# Patient Record
Sex: FEMALE | Race: WHITE | NOT HISPANIC OR LATINO | URBAN - METROPOLITAN AREA
[De-identification: names, ages, dates, MRNs, and addresses within clinical notes are randomized per-mention and may not be internally consistent; named-entity substitution may affect disease eponyms.]

---

## 2023-04-17 LAB
ALANINE AMINOTRANSFERASE (SGPT) (U/L) IN SER/PLAS: 20 U/L (ref 7–45)
ALBUMIN (G/DL) IN SER/PLAS: 4.5 G/DL (ref 3.4–5)
ALKALINE PHOSPHATASE (U/L) IN SER/PLAS: 75 U/L (ref 33–110)
ANION GAP IN SER/PLAS: 13 MMOL/L (ref 10–20)
ASPARTATE AMINOTRANSFERASE (SGOT) (U/L) IN SER/PLAS: 15 U/L (ref 9–39)
BASOPHILS (10*3/UL) IN BLOOD BY AUTOMATED COUNT: 0.03 X10E9/L (ref 0–0.1)
BASOPHILS/100 LEUKOCYTES IN BLOOD BY AUTOMATED COUNT: 0.3 % (ref 0–2)
BILIRUBIN TOTAL (MG/DL) IN SER/PLAS: 0.8 MG/DL (ref 0–1.2)
CALCIDIOL (25 OH VITAMIN D3) (NG/ML) IN SER/PLAS: 28 NG/ML
CALCIUM (MG/DL) IN SER/PLAS: 9.5 MG/DL (ref 8.6–10.6)
CARBON DIOXIDE, TOTAL (MMOL/L) IN SER/PLAS: 25 MMOL/L (ref 21–32)
CHLORIDE (MMOL/L) IN SER/PLAS: 107 MMOL/L (ref 98–107)
CHOLESTEROL (MG/DL) IN SER/PLAS: 164 MG/DL (ref 0–199)
CHOLESTEROL IN HDL (MG/DL) IN SER/PLAS: 63.5 MG/DL
CHOLESTEROL/HDL RATIO: 2.6
CREATININE (MG/DL) IN SER/PLAS: 0.76 MG/DL (ref 0.5–1.05)
EOSINOPHILS (10*3/UL) IN BLOOD BY AUTOMATED COUNT: 0.25 X10E9/L (ref 0–0.7)
EOSINOPHILS/100 LEUKOCYTES IN BLOOD BY AUTOMATED COUNT: 2.8 % (ref 0–6)
ERYTHROCYTE DISTRIBUTION WIDTH (RATIO) BY AUTOMATED COUNT: 13 % (ref 11.5–14.5)
ERYTHROCYTE MEAN CORPUSCULAR HEMOGLOBIN CONCENTRATION (G/DL) BY AUTOMATED: 32.9 G/DL (ref 32–36)
ERYTHROCYTE MEAN CORPUSCULAR VOLUME (FL) BY AUTOMATED COUNT: 82 FL (ref 80–100)
ERYTHROCYTES (10*6/UL) IN BLOOD BY AUTOMATED COUNT: 5.25 X10E12/L (ref 4–5.2)
GFR FEMALE: >90 ML/MIN/1.73M2
GLUCOSE (MG/DL) IN SER/PLAS: 86 MG/DL (ref 74–99)
HEMATOCRIT (%) IN BLOOD BY AUTOMATED COUNT: 43.2 % (ref 36–46)
HEMOGLOBIN (G/DL) IN BLOOD: 14.2 G/DL (ref 12–16)
IMMATURE GRANULOCYTES/100 LEUKOCYTES IN BLOOD BY AUTOMATED COUNT: 0.1 % (ref 0–0.9)
LDL: 85 MG/DL (ref 0–99)
LEUKOCYTES (10*3/UL) IN BLOOD BY AUTOMATED COUNT: 8.8 X10E9/L (ref 4.4–11.3)
LYMPHOCYTES (10*3/UL) IN BLOOD BY AUTOMATED COUNT: 4.26 X10E9/L (ref 1.2–4.8)
LYMPHOCYTES/100 LEUKOCYTES IN BLOOD BY AUTOMATED COUNT: 48.4 % (ref 13–44)
MONOCYTES (10*3/UL) IN BLOOD BY AUTOMATED COUNT: 0.48 X10E9/L (ref 0.1–1)
MONOCYTES/100 LEUKOCYTES IN BLOOD BY AUTOMATED COUNT: 5.5 % (ref 2–10)
NEUTROPHILS (10*3/UL) IN BLOOD BY AUTOMATED COUNT: 3.77 X10E9/L (ref 1.2–7.7)
NEUTROPHILS/100 LEUKOCYTES IN BLOOD BY AUTOMATED COUNT: 42.9 % (ref 40–80)
NRBC (PER 100 WBCS) BY AUTOMATED COUNT: 0 /100 WBC (ref 0–0)
PLATELETS (10*3/UL) IN BLOOD AUTOMATED COUNT: 255 X10E9/L (ref 150–450)
POTASSIUM (MMOL/L) IN SER/PLAS: 4.2 MMOL/L (ref 3.5–5.3)
PROTEIN TOTAL: 7.5 G/DL (ref 6.4–8.2)
SODIUM (MMOL/L) IN SER/PLAS: 141 MMOL/L (ref 136–145)
THYROTROPIN (MIU/L) IN SER/PLAS BY DETECTION LIMIT <= 0.05 MIU/L: 1.64 MIU/L (ref 0.44–3.98)
THYROXINE (T4) FREE (NG/DL) IN SER/PLAS: 1.12 NG/DL (ref 0.78–1.48)
TRIGLYCERIDE (MG/DL) IN SER/PLAS: 76 MG/DL (ref 0–149)
TRIIODOTHYRONINE (T3) FREE (PG/ML) IN SER/PLAS: 3.6 PG/ML (ref 2.3–4.2)
UREA NITROGEN (MG/DL) IN SER/PLAS: 14 MG/DL (ref 6–23)
VLDL: 15 MG/DL (ref 0–40)

## 2023-11-17 ENCOUNTER — LAB (OUTPATIENT)
Dept: LAB | Facility: LAB | Age: 28
End: 2023-11-17
Payer: COMMERCIAL

## 2023-11-17 ENCOUNTER — OFFICE VISIT (OUTPATIENT)
Dept: PRIMARY CARE | Facility: CLINIC | Age: 28
End: 2023-11-17
Payer: COMMERCIAL

## 2023-11-17 VITALS
DIASTOLIC BLOOD PRESSURE: 82 MMHG | BODY MASS INDEX: 32.27 KG/M2 | WEIGHT: 189 LBS | TEMPERATURE: 96.8 F | OXYGEN SATURATION: 98 % | HEART RATE: 71 BPM | HEIGHT: 64 IN | SYSTOLIC BLOOD PRESSURE: 111 MMHG

## 2023-11-17 DIAGNOSIS — R63.5 WEIGHT GAIN: ICD-10-CM

## 2023-11-17 DIAGNOSIS — R20.0 NUMBNESS AND TINGLING IN RIGHT HAND: Primary | ICD-10-CM

## 2023-11-17 DIAGNOSIS — R20.2 NUMBNESS AND TINGLING IN RIGHT HAND: ICD-10-CM

## 2023-11-17 DIAGNOSIS — R20.0 NUMBNESS AND TINGLING IN RIGHT HAND: ICD-10-CM

## 2023-11-17 DIAGNOSIS — R20.2 NUMBNESS AND TINGLING IN RIGHT HAND: Primary | ICD-10-CM

## 2023-11-17 PROCEDURE — 80053 COMPREHEN METABOLIC PANEL: CPT

## 2023-11-17 PROCEDURE — 83735 ASSAY OF MAGNESIUM: CPT

## 2023-11-17 PROCEDURE — 85025 COMPLETE CBC W/AUTO DIFF WBC: CPT

## 2023-11-17 PROCEDURE — 84443 ASSAY THYROID STIM HORMONE: CPT

## 2023-11-17 PROCEDURE — 36415 COLL VENOUS BLD VENIPUNCTURE: CPT

## 2023-11-17 PROCEDURE — 99213 OFFICE O/P EST LOW 20 MIN: CPT | Performed by: NURSE PRACTITIONER

## 2023-11-17 PROCEDURE — 86140 C-REACTIVE PROTEIN: CPT

## 2023-11-17 PROCEDURE — 1036F TOBACCO NON-USER: CPT | Performed by: NURSE PRACTITIONER

## 2023-11-17 RX ORDER — PREDNISONE 10 MG/1
TABLET ORAL
Qty: 30 TABLET | Refills: 0 | Status: SHIPPED | OUTPATIENT
Start: 2023-11-17 | End: 2023-11-29

## 2023-11-17 ASSESSMENT — ENCOUNTER SYMPTOMS
NAUSEA: 0
VOMITING: 0
WOUND: 0
PALPITATIONS: 0
DIARRHEA: 0
SHORTNESS OF BREATH: 0
NERVOUS/ANXIOUS: 0
WHEEZING: 0
COUGH: 0
ACTIVITY CHANGE: 0
ABDOMINAL PAIN: 0
ABDOMINAL DISTENTION: 0
EYE ITCHING: 0
APPETITE CHANGE: 0
DYSURIA: 0
FREQUENCY: 0
EYE PAIN: 0
HEMATURIA: 0
CONSTIPATION: 0

## 2023-11-17 NOTE — PROGRESS NOTES
Chief Complaint  Follow-up (Numbness and tinglling in hands ).    History Of Present Illness  Suzanne Suazo is a 28 y.o. female presents today for follow up of Follow-up (Numbness and tinglling in hands ).  HPI      Numbness and tingling in R hand-- can't do anything such as chopping vegetables, brushing hair. Reports that numbness starts at crease of elbow and down.  Sometimes feels that from  shoulder down is affected. Weakness noted in R hand, started 10 months ago.  Tingling started x 3-4 weeks but worse over last 2.  Reports that potentially today  R foot felt slightly numb, but it was very mild, states is not sure if truly felt or she is anxious so she is thinking it was affected.  Denies neck pain.  Denies injuries, falls.   Today body feeling stiff and sore.  Sleep is poor due to small children and work (frequently works 3p-3a).    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  No prior imaging of Brain;      Denies nausea, vomiting, falls, injuries.   Gastroenteritis over last few months.   Was Breastfeeding infant--stopped 1 month.   Does report that she was overusing R hand due to manual breast milk pumping--questioning if numbness related to overuse injury.      Review of Systems  Review of Systems   Constitutional:  Positive for fatigue and unexpected weight change (weight gain). Negative for activity change, appetite change, chills, diaphoresis and fever.   HENT:  Negative for congestion.    Eyes:  Negative for photophobia, pain, discharge, redness, itching and visual disturbance.        Baseline L eye legal blindness   Respiratory:  Negative for cough, shortness of breath and wheezing.    Cardiovascular:  Negative for chest pain, palpitations and leg swelling.   Gastrointestinal:  Negative for abdominal distention, abdominal pain, constipation, diarrhea, nausea and vomiting.   Endocrine: Negative for cold intolerance  "and heat intolerance.   Genitourinary:  Negative for dysuria, frequency, hematuria and urgency.   Musculoskeletal:  Positive for arthralgias (discomfort in R AC area) and myalgias (R forearm/hand). Negative for gait problem, neck pain and neck stiffness.   Skin:  Negative for rash and wound.   Neurological:  Positive for facial asymmetry (chronic L eye mild ptosis), weakness (R UE) and numbness. Negative for dizziness, tremors, seizures, syncope, speech difficulty, light-headedness and headaches.   Psychiatric/Behavioral:  Negative for behavioral problems, confusion and hallucinations. The patient is not nervous/anxious and is not hyperactive.        Past Medical History  She has a past medical history of Exercise induced bronchospasm (04/14/2017), Other conditions influencing health status (04/14/2017), Personal history of other diseases of the digestive system, Personal history of other specified conditions (05/20/2014), Personal history of other specified conditions (04/16/2019), Polycystic ovarian syndrome (04/16/2018), and Rash and other nonspecific skin eruption (05/28/2015).    Surgical History  She has a past surgical history that includes Other surgical history (05/20/2014); Other surgical history (05/20/2014); Other surgical history (10/18/2019); Other surgical history (12/20/2019); Other surgical history (12/20/2019); and Other surgical history (04/12/2021).    Family History  No family history on file.     Social History  She reports that she has never smoked. She has never used smokeless tobacco. She reports that she does not currently use alcohol. She reports that she does not use drugs.    Allergies  Sulfa (sulfonamide antibiotics)    Medications  No current outpatient medications     Objective   /82   Pulse 71   Temp 36 °C (96.8 °F) (Temporal)   Ht 1.626 m (5' 4\")   Wt 85.7 kg (189 lb)   LMP  (LMP Unknown) Comment: spotting last couple months  SpO2 98%   BMI 32.44 kg/m²    BMI: Estimated " "body mass index is 32.44 kg/m² as calculated from the following:    Height as of this encounter: 1.626 m (5' 4\").    Weight as of this encounter: 85.7 kg (189 lb).    Physical Exam  Physical Exam  Vitals and nursing note reviewed.   Constitutional:       Appearance: Normal appearance.   HENT:      Head: Normocephalic and atraumatic.      Nose: Nose normal.      Mouth/Throat:      Mouth: Mucous membranes are moist.      Pharynx: Oropharynx is clear.   Eyes:      Extraocular Movements: Extraocular movements intact.      Conjunctiva/sclera: Conjunctivae normal.      Pupils: Pupils are equal, round, and reactive to light.      Comments: L pupil reactive but quickly dilates again even w light exposure   Cardiovascular:      Rate and Rhythm: Normal rate and regular rhythm.      Pulses: Normal pulses.      Heart sounds: Normal heart sounds.   Pulmonary:      Effort: Pulmonary effort is normal.      Breath sounds: Normal breath sounds.   Abdominal:      General: Bowel sounds are normal.      Palpations: Abdomen is soft.      Tenderness: There is no abdominal tenderness.   Musculoskeletal:         General: No swelling, tenderness, deformity or signs of injury. Normal range of motion.      Cervical back: Neck supple.      Right lower leg: No edema.      Left lower leg: No edema.   Skin:     General: Skin is warm and dry.   Neurological:      General: No focal deficit present.      Mental Status: She is alert and oriented to person, place, and time. Mental status is at baseline.      Cranial Nerves: No cranial nerve deficit, dysarthria or facial asymmetry (L eyelid mild ptosis (chronic finding)).      Sensory: Sensation is intact. No sensory deficit.      Motor: No weakness, tremor, atrophy, abnormal muscle tone or seizure activity.      Coordination: Coordination normal. Finger-Nose-Finger Test normal.      Gait: Gait normal.   Psychiatric:         Mood and Affect: Mood normal.         Behavior: Behavior normal.         " Thought Content: Thought content normal.         Judgment: Judgment normal.         Relevant Results and Imaging  Orders Only on 04/17/2023   Component Date Value Ref Range Status    WBC 04/17/2023 8.8  4.4 - 11.3 x10E9/L Final    nRBC 04/17/2023 0.0  0.0 - 0.0 /100 WBC Final    RBC 04/17/2023 5.25 (H)  4.00 - 5.20 x10E12/L Final    Hemoglobin 04/17/2023 14.2  12.0 - 16.0 g/dL Final    Hematocrit 04/17/2023 43.2  36.0 - 46.0 % Final    MCV 04/17/2023 82  80 - 100 fL Final    MCHC 04/17/2023 32.9  32.0 - 36.0 g/dL Final    Platelets 04/17/2023 255  150 - 450 x10E9/L Final    RDW 04/17/2023 13.0  11.5 - 14.5 % Final    Neutrophils % 04/17/2023 42.9  40.0 - 80.0 % Final    Immature Granulocytes %, Automated 04/17/2023 0.1  0.0 - 0.9 % Final    Comment:  Immature Granulocyte Count (IG) includes promyelocytes,    myelocytes and metamyelocytes but does not include bands.   Percent differential counts (%) should be interpreted in the   context of the absolute cell counts (cells/L).      Lymphocytes % 04/17/2023 48.4  13.0 - 44.0 % Final    Monocytes % 04/17/2023 5.5  2.0 - 10.0 % Final    Eosinophils % 04/17/2023 2.8  0.0 - 6.0 % Final    Basophils % 04/17/2023 0.3  0.0 - 2.0 % Final    Neutrophils Absolute 04/17/2023 3.77  1.20 - 7.70 x10E9/L Final    Lymphocytes Absolute 04/17/2023 4.26  1.20 - 4.80 x10E9/L Final    Monocytes Absolute 04/17/2023 0.48  0.10 - 1.00 x10E9/L Final    Eosinophils Absolute 04/17/2023 0.25  0.00 - 0.70 x10E9/L Final    Basophils Absolute 04/17/2023 0.03  0.00 - 0.10 x10E9/L Final   Orders Only on 04/17/2023   Component Date Value Ref Range Status    Vitamin D, 25-Hydroxy 04/17/2023 28 (A)  ng/mL Final    Comment: .  DEFICIENCY:         < 20   NG/ML  INSUFFICIENCY:      20-29  NG/ML  SUFFICIENCY:         NG/ML    THIS ASSAY ACCURATELY QUANTIFIES THE SUM OF  VITAMIN D3, 25-HYDROXY AND VIT D2,25-HYDROXY.     Orders Only on 04/17/2023   Component Date Value Ref Range Status    Cholesterol  04/17/2023 164  0 - 199 mg/dL Final    Comment: .      AGE      DESIRABLE   BORDERLINE HIGH   HIGH     0-19 Y     0 - 169       170 - 199     >/= 200    20-24 Y     0 - 189       190 - 224     >/= 225         >24 Y     0 - 199       200 - 239     >/= 240   **All ranges are based on fasting samples. Specific   therapeutic targets will vary based on patient-specific   cardiac risk.  .   Pediatric guidelines reference:Pediatrics 2011, 128(S5).   Adult guidelines reference: NCEP ATPIII Guidelines,     YIFAN 2001, 258:2486-97  .   Venipuncture immediately after or during the    administration of Metamizole may lead to falsely   low results. Testing should be performed immediately   prior to Metamizole dosing.      HDL 04/17/2023 63.5  mg/dL Final    Comment: .      AGE      VERY LOW   LOW     NORMAL    HIGH       0-19 Y       < 35   < 40     40-45     ----    20-24 Y       ----   < 40       >45     ----      >24 Y       ----   < 40     40-60      >60  .      Cholesterol/HDL Ratio 04/17/2023 2.6   Final    Comment: REF VALUES  DESIRABLE  < 3.4  HIGH RISK  > 5.0      LDL 04/17/2023 85  0 - 99 mg/dL Final    Comment: .                           NEAR      BORD      AGE      DESIRABLE  OPTIMAL    HIGH     HIGH     VERY HIGH     0-19 Y     0 - 109     ---    110-129   >/= 130     ----    20-24 Y     0 - 119     ---    120-159   >/= 160     ----      >24 Y     0 -  99   100-129  130-159   160-189     >/=190  .      VLDL 04/17/2023 15  0 - 40 mg/dL Final    Triglycerides 04/17/2023 76  0 - 149 mg/dL Final    Comment: .      AGE      DESIRABLE   BORDERLINE HIGH   HIGH     VERY HIGH   0 D-90 D    19 - 174         ----         ----        ----  91 D- 9 Y     0 -  74        75 -  99     >/= 100      ----    10-19 Y     0 -  89        90 - 129     >/= 130      ----    20-24 Y     0 - 114       115 - 149     >/= 150      ----         >24 Y     0 - 149       150 - 199    200- 499    >/= 500  .   Venipuncture immediately after or during  the    administration of Metamizole may lead to falsely   low results. Testing should be performed immediately   prior to Metamizole dosing.     Orders Only on 04/17/2023   Component Date Value Ref Range Status    Free T4 04/17/2023 1.12  0.78 - 1.48 ng/dL Final    Comment:  Thyroxine Free testing is performed using different testing    methodology at [facility] than at other    [facility]. Direct result comparisons should    only be made within the same method.     Orders Only on 04/17/2023   Component Date Value Ref Range Status    T3, Free 04/17/2023 3.6  2.3 - 4.2 pg/mL Final   Orders Only on 04/17/2023   Component Date Value Ref Range Status    Glucose 04/17/2023 86  74 - 99 mg/dL Final    Sodium 04/17/2023 141  136 - 145 mmol/L Final    Potassium 04/17/2023 4.2  3.5 - 5.3 mmol/L Final    Chloride 04/17/2023 107  98 - 107 mmol/L Final    Bicarbonate 04/17/2023 25  21 - 32 mmol/L Final    Anion Gap 04/17/2023 13  10 - 20 mmol/L Final    Urea Nitrogen 04/17/2023 14  6 - 23 mg/dL Final    Creatinine 04/17/2023 0.76  0.50 - 1.05 mg/dL Final    GFR Female 04/17/2023 >90  >90 mL/min/1.73m2 Final    Comment:  CALCULATIONS OF ESTIMATED GFR ARE PERFORMED   USING THE 2021 CKD-EPI STUDY REFIT EQUATION   WITHOUT THE RACE VARIABLE FOR THE IDMS-TRACEABLE   CREATININE METHODS.    https://jasn.asnjournals.org/content/early/2021/09/22/ASN.0369969366      Calcium 04/17/2023 9.5  8.6 - 10.6 mg/dL Final    Albumin 04/17/2023 4.5  3.4 - 5.0 g/dL Final    Alkaline Phosphatase 04/17/2023 75  33 - 110 U/L Final    Total Protein 04/17/2023 7.5  6.4 - 8.2 g/dL Final    AST 04/17/2023 15  9 - 39 U/L Final    Total Bilirubin 04/17/2023 0.8  0.0 - 1.2 mg/dL Final    ALT (SGPT) 04/17/2023 20  7 - 45 U/L Final    Comment:  Patients treated with Sulfasalazine may generate    falsely decreased results for ALT.     Orders Only on 04/17/2023   Component Date Value Ref Range Status    TSH 04/17/2023 1.64  0.44 - 3.98 mIU/L  Final    Comment:  TSH testing is performed using different testing    methodology at [facility] than at other    [facility] hospitals. Direct result comparisons should    only be made within the same method.       No images are attached to the encounter.        Assessment and Plan  Assessment/Plan   Problem List Items Addressed This Visit    None  Visit Diagnoses         Codes    Numbness and tingling in right hand    -  Primary R20.0, R20.2    Relevant Medications    predniSONE (Deltasone) 10 mg tablet    Other Relevant Orders    Comprehensive Metabolic Panel (Completed)    CBC and Auto Differential (Completed)    Magnesium (Completed)    Weight gain     R63.5    Relevant Orders    Tsh With Reflex To Free T4 If Abnormal (Completed)

## 2023-11-18 LAB
ALBUMIN SERPL BCP-MCNC: 4.6 G/DL (ref 3.4–5)
ALP SERPL-CCNC: 67 U/L (ref 33–110)
ALT SERPL W P-5'-P-CCNC: 23 U/L (ref 7–45)
ANION GAP SERPL CALC-SCNC: 15 MMOL/L (ref 10–20)
AST SERPL W P-5'-P-CCNC: 15 U/L (ref 9–39)
BASOPHILS # BLD AUTO: 0.02 X10*3/UL (ref 0–0.1)
BASOPHILS NFR BLD AUTO: 0.2 %
BILIRUB SERPL-MCNC: 1 MG/DL (ref 0–1.2)
BUN SERPL-MCNC: 13 MG/DL (ref 6–23)
CALCIUM SERPL-MCNC: 9.2 MG/DL (ref 8.6–10.6)
CHLORIDE SERPL-SCNC: 104 MMOL/L (ref 98–107)
CO2 SERPL-SCNC: 23 MMOL/L (ref 21–32)
CREAT SERPL-MCNC: 0.71 MG/DL (ref 0.5–1.05)
EOSINOPHIL # BLD AUTO: 0.15 X10*3/UL (ref 0–0.7)
EOSINOPHIL NFR BLD AUTO: 1.5 %
ERYTHROCYTE [DISTWIDTH] IN BLOOD BY AUTOMATED COUNT: 11.9 % (ref 11.5–14.5)
GFR SERPL CREATININE-BSD FRML MDRD: >90 ML/MIN/1.73M*2
GLUCOSE SERPL-MCNC: 80 MG/DL (ref 74–99)
HCT VFR BLD AUTO: 44 % (ref 36–46)
HGB BLD-MCNC: 14.7 G/DL (ref 12–16)
IMM GRANULOCYTES # BLD AUTO: 0.03 X10*3/UL (ref 0–0.7)
IMM GRANULOCYTES NFR BLD AUTO: 0.3 % (ref 0–0.9)
LYMPHOCYTES # BLD AUTO: 4.06 X10*3/UL (ref 1.2–4.8)
LYMPHOCYTES NFR BLD AUTO: 41.8 %
MAGNESIUM SERPL-MCNC: 2.23 MG/DL (ref 1.6–2.4)
MCH RBC QN AUTO: 27.6 PG (ref 26–34)
MCHC RBC AUTO-ENTMCNC: 33.4 G/DL (ref 32–36)
MCV RBC AUTO: 83 FL (ref 80–100)
MONOCYTES # BLD AUTO: 0.54 X10*3/UL (ref 0.1–1)
MONOCYTES NFR BLD AUTO: 5.6 %
NEUTROPHILS # BLD AUTO: 4.91 X10*3/UL (ref 1.2–7.7)
NEUTROPHILS NFR BLD AUTO: 50.6 %
NRBC BLD-RTO: 0 /100 WBCS (ref 0–0)
PLATELET # BLD AUTO: 259 X10*3/UL (ref 150–450)
POTASSIUM SERPL-SCNC: 3.9 MMOL/L (ref 3.5–5.3)
PROT SERPL-MCNC: 7.3 G/DL (ref 6.4–8.2)
RBC # BLD AUTO: 5.32 X10*6/UL (ref 4–5.2)
SODIUM SERPL-SCNC: 138 MMOL/L (ref 136–145)
TSH SERPL-ACNC: 1.63 MIU/L (ref 0.44–3.98)
WBC # BLD AUTO: 9.7 X10*3/UL (ref 4.4–11.3)

## 2023-11-18 ASSESSMENT — ENCOUNTER SYMPTOMS
HALLUCINATIONS: 0
SPEECH DIFFICULTY: 0
FATIGUE: 1
HEADACHES: 0
WEAKNESS: 1
EYE REDNESS: 0
DIAPHORESIS: 0
CONFUSION: 0
LIGHT-HEADEDNESS: 0
HYPERACTIVE: 0
EYE DISCHARGE: 0
DIZZINESS: 0
NECK PAIN: 0
MYALGIAS: 1
FACIAL ASYMMETRY: 1
PHOTOPHOBIA: 0
NECK STIFFNESS: 0
FEVER: 0
CHILLS: 0
UNEXPECTED WEIGHT CHANGE: 1
ARTHRALGIAS: 1
SEIZURES: 0
NUMBNESS: 1
TREMORS: 0

## 2023-11-20 DIAGNOSIS — R20.0 NUMBNESS AND TINGLING IN RIGHT HAND: Primary | ICD-10-CM

## 2023-11-20 DIAGNOSIS — R20.2 NUMBNESS AND TINGLING IN RIGHT HAND: Primary | ICD-10-CM

## 2023-11-20 LAB — CRP SERPL-MCNC: 0.57 MG/DL

## 2023-11-29 DIAGNOSIS — R20.0 FACIAL NUMBNESS: ICD-10-CM

## 2023-11-29 DIAGNOSIS — R20.2 NUMBNESS AND TINGLING IN RIGHT HAND: Primary | ICD-10-CM

## 2023-11-29 DIAGNOSIS — R20.0 NUMBNESS AND TINGLING IN RIGHT HAND: Primary | ICD-10-CM

## 2023-12-08 ENCOUNTER — HOSPITAL ENCOUNTER (OUTPATIENT)
Dept: RADIOLOGY | Facility: CLINIC | Age: 28
Discharge: HOME | End: 2023-12-08
Payer: COMMERCIAL

## 2023-12-08 DIAGNOSIS — R20.2 NUMBNESS AND TINGLING IN RIGHT HAND: ICD-10-CM

## 2023-12-08 DIAGNOSIS — R20.0 FACIAL NUMBNESS: ICD-10-CM

## 2023-12-08 DIAGNOSIS — R20.0 NUMBNESS AND TINGLING IN RIGHT HAND: ICD-10-CM

## 2023-12-08 PROCEDURE — 70551 MRI BRAIN STEM W/O DYE: CPT | Performed by: RADIOLOGY

## 2023-12-08 PROCEDURE — 70551 MRI BRAIN STEM W/O DYE: CPT

## 2024-02-26 ENCOUNTER — APPOINTMENT (OUTPATIENT)
Dept: DERMATOLOGY | Facility: CLINIC | Age: 29
End: 2024-02-26
Payer: COMMERCIAL

## 2024-03-05 ENCOUNTER — OFFICE VISIT (OUTPATIENT)
Dept: PRIMARY CARE | Facility: CLINIC | Age: 29
End: 2024-03-05
Payer: COMMERCIAL

## 2024-03-05 VITALS
OXYGEN SATURATION: 97 % | HEIGHT: 64 IN | HEART RATE: 75 BPM | DIASTOLIC BLOOD PRESSURE: 78 MMHG | BODY MASS INDEX: 33.2 KG/M2 | SYSTOLIC BLOOD PRESSURE: 122 MMHG | WEIGHT: 194.5 LBS | TEMPERATURE: 97.1 F

## 2024-03-05 DIAGNOSIS — Z80.3 FAMILY HISTORY OF BREAST CANCER IN FIRST DEGREE RELATIVE: ICD-10-CM

## 2024-03-05 DIAGNOSIS — Z00.00 ROUTINE MEDICAL EXAM: ICD-10-CM

## 2024-03-05 DIAGNOSIS — R20.0 NUMBNESS AND TINGLING IN RIGHT HAND: Primary | ICD-10-CM

## 2024-03-05 DIAGNOSIS — R20.2 NUMBNESS AND TINGLING IN RIGHT HAND: Primary | ICD-10-CM

## 2024-03-05 PROCEDURE — 99214 OFFICE O/P EST MOD 30 MIN: CPT | Performed by: NURSE PRACTITIONER

## 2024-03-05 PROCEDURE — 1036F TOBACCO NON-USER: CPT | Performed by: NURSE PRACTITIONER

## 2024-03-05 ASSESSMENT — ENCOUNTER SYMPTOMS
CHILLS: 0
PALPITATIONS: 0
NUMBNESS: 1
DIAPHORESIS: 0
COUGH: 0
SHORTNESS OF BREATH: 0
DYSURIA: 0
FEVER: 0
WOUND: 0
APPETITE CHANGE: 0
FATIGUE: 0
HEMATURIA: 0
EYE ITCHING: 0
CONSTIPATION: 0
EYE REDNESS: 0
EYE PAIN: 0
FREQUENCY: 0
DIARRHEA: 0
WHEEZING: 0
ABDOMINAL DISTENTION: 0
ARTHRALGIAS: 0
NERVOUS/ANXIOUS: 0
VOMITING: 0
ACTIVITY CHANGE: 0
WEAKNESS: 0
ABDOMINAL PAIN: 0

## 2024-03-05 ASSESSMENT — PATIENT HEALTH QUESTIONNAIRE - PHQ9
1. LITTLE INTEREST OR PLEASURE IN DOING THINGS: NOT AT ALL
SUM OF ALL RESPONSES TO PHQ9 QUESTIONS 1 AND 2: 0
2. FEELING DOWN, DEPRESSED OR HOPELESS: NOT AT ALL

## 2024-03-05 NOTE — PROGRESS NOTES
Chief Complaint  follow up MRI.    History Of Present Illness  Suzanne Suazo is a 28 y.o. female presents today for follow up MRI.  Suzanne has been experiencing numbness, tingling, mild nerve pain of bilateral upper extremities.  Reports that numbness and tingling is more significant in right hand, but also notes it on lateral side of right forearm, and lateral side of right upper arm and posterior neck.  Additionally now is noting mild numbness and tingling of left hand.  Symptoms are made worse by overuse.  Reports that recently she traveled to [place] where she was lifting and carrying her children (toddlers), their car seats etc.  Now right hand numbness worse.  Denies baseline weakness in the hand but reports that continuous muscle activity eventually leads to earlier than normal muscle fatigue .  Has not had any cervical imaging in past.    Had visited chiro during pregnancy and after   R hand numbness,  discomfort in lateral side and shoulder     L hand numbness (all fingers) - occsionally also has pain in fingers and hands.    No falls or injuries.       Prior workup includes noncontrast MRI brain 2023-unremarkable noncontrast MRI except for a susceptibility artifact in the right auricular and infra-auricular region.    Suzanne reports family history of breast cancer (maternal grandmother  at age 56,  Suzannes mom diagnosed w breast cancer at age 59).  BRCA gene tested for mom- negative.  Pt states she would like a referral to breast oncology for evaluation.   Review of Systems  Review of Systems   Constitutional:  Negative for activity change, appetite change, chills, diaphoresis, fatigue and fever.   HENT:  Negative for congestion.    Eyes:  Positive for visual disturbance (chronic decreased vision in L eye). Negative for pain, redness and itching.   Respiratory:  Negative for cough, shortness of breath and wheezing.    Cardiovascular:  Negative for chest pain, palpitations and leg swelling.    Gastrointestinal:  Negative for abdominal distention, abdominal pain, constipation, diarrhea and vomiting.   Genitourinary:  Negative for dysuria, frequency, hematuria and urgency.   Musculoskeletal:  Negative for arthralgias and gait problem.   Skin:  Negative for rash and wound.   Neurological:  Positive for numbness. Negative for weakness.   Psychiatric/Behavioral:  The patient is not nervous/anxious.        Past Medical History  She has a past medical history of Exercise induced bronchospasm (2017), Other conditions influencing health status (2017), Personal history of other diseases of the digestive system, Personal history of other specified conditions (2014), Personal history of other specified conditions (2019), Polycystic ovarian syndrome (2018), and Rash and other nonspecific skin eruption (2015).    Surgical History  She has a past surgical history that includes Other surgical history (2014); Other surgical history (2014); Other surgical history (10/18/2019); Other surgical history (2019); Other surgical history (2019); and Other surgical history (2021).    Family History  Family History   Problem Relation Name Age of Onset    Breast cancer Mother  59    Breast cancer Maternal Grandmother           at age 56        Social History  She reports that she has never smoked. She has never used smokeless tobacco. She reports that she does not currently use alcohol. She reports that she does not use drugs.    DEPRESSION SCREEN  Over the past 2 weeks, how often have you been bothered by any of the following problems?  Little interest or pleasure in doing things: Not at all  Feeling down, depressed, or hopeless: Not at all      Allergies  Sulfa (sulfonamide antibiotics)    Medications  Current Outpatient Medications   Medication Instructions    prenatal vit,calc76-iron-folic (Prenatabs Rx) 29 mg iron- 1 mg tablet oral        Objective   BP  "122/78   Pulse 75   Temp 36.2 °C (97.1 °F)   Ht 1.626 m (5' 4\")   Wt 88.2 kg (194 lb 8 oz)   SpO2 97%   BMI 33.39 kg/m²    BMI: Estimated body mass index is 33.39 kg/m² as calculated from the following:    Height as of this encounter: 1.626 m (5' 4\").    Weight as of this encounter: 88.2 kg (194 lb 8 oz).    Physical Exam  Physical Exam  Vitals and nursing note reviewed.   Constitutional:       Appearance: Normal appearance.   HENT:      Head: Normocephalic and atraumatic.      Nose: Nose normal.      Mouth/Throat:      Mouth: Mucous membranes are moist.      Pharynx: Oropharynx is clear.   Eyes:      Extraocular Movements: Extraocular movements intact.      Conjunctiva/sclera: Conjunctivae normal.      Pupils: Pupils are equal, round, and reactive to light.      Comments: L pupil reactive but quickly dilates again even w light exposure   Cardiovascular:      Rate and Rhythm: Normal rate and regular rhythm.      Pulses: Normal pulses.      Heart sounds: Normal heart sounds.   Pulmonary:      Effort: Pulmonary effort is normal.      Breath sounds: Normal breath sounds.   Abdominal:      General: Bowel sounds are normal.      Palpations: Abdomen is soft.      Tenderness: There is no abdominal tenderness.   Musculoskeletal:         General: No swelling, tenderness, deformity or signs of injury. Normal range of motion.      Cervical back: Neck supple.      Right lower leg: No edema.      Left lower leg: No edema.   Skin:     General: Skin is warm and dry.   Neurological:      General: No focal deficit present.      Mental Status: She is alert and oriented to person, place, and time. Mental status is at baseline.      Cranial Nerves: No cranial nerve deficit, dysarthria or facial asymmetry (L eyelid mild ptosis (chronic finding)).      Sensory: Sensation is intact. No sensory deficit.      Motor: No weakness, tremor, atrophy, abnormal muscle tone or seizure activity.      Coordination: Coordination normal. " Finger-Nose-Finger Test normal.      Gait: Gait normal.   Psychiatric:         Mood and Affect: Mood normal.         Behavior: Behavior normal.         Thought Content: Thought content normal.         Judgment: Judgment normal.         Relevant Results and Imaging  Lab on 11/17/2023   Component Date Value Ref Range Status    Glucose 11/17/2023 80  74 - 99 mg/dL Final    Sodium 11/17/2023 138  136 - 145 mmol/L Final    Potassium 11/17/2023 3.9  3.5 - 5.3 mmol/L Final    Chloride 11/17/2023 104  98 - 107 mmol/L Final    Bicarbonate 11/17/2023 23  21 - 32 mmol/L Final    Anion Gap 11/17/2023 15  10 - 20 mmol/L Final    Urea Nitrogen 11/17/2023 13  6 - 23 mg/dL Final    Creatinine 11/17/2023 0.71  0.50 - 1.05 mg/dL Final    eGFR 11/17/2023 >90  >60 mL/min/1.73m*2 Final    Calculations of estimated GFR are performed using the 2021 CKD-EPI Study Refit equation without the race variable for the IDMS-Traceable creatinine methods.  https://jasn.asnjournals.org/content/early/2021/09/22/ASN.3973268993    Calcium 11/17/2023 9.2  8.6 - 10.6 mg/dL Final    Albumin 11/17/2023 4.6  3.4 - 5.0 g/dL Final    Alkaline Phosphatase 11/17/2023 67  33 - 110 U/L Final    Total Protein 11/17/2023 7.3  6.4 - 8.2 g/dL Final    AST 11/17/2023 15  9 - 39 U/L Final    Bilirubin, Total 11/17/2023 1.0  0.0 - 1.2 mg/dL Final    ALT 11/17/2023 23  7 - 45 U/L Final    Patients treated with Sulfasalazine may generate falsely decreased results for ALT.    WBC 11/17/2023 9.7  4.4 - 11.3 x10*3/uL Final    nRBC 11/17/2023 0.0  0.0 - 0.0 /100 WBCs Final    RBC 11/17/2023 5.32 (H)  4.00 - 5.20 x10*6/uL Final    Hemoglobin 11/17/2023 14.7  12.0 - 16.0 g/dL Final    Hematocrit 11/17/2023 44.0  36.0 - 46.0 % Final    MCV 11/17/2023 83  80 - 100 fL Final    MCH 11/17/2023 27.6  26.0 - 34.0 pg Final    MCHC 11/17/2023 33.4  32.0 - 36.0 g/dL Final    RDW 11/17/2023 11.9  11.5 - 14.5 % Final    Platelets 11/17/2023 259  150 - 450 x10*3/uL Final    Neutrophils %  11/17/2023 50.6  40.0 - 80.0 % Final    Immature Granulocytes %, Automated 11/17/2023 0.3  0.0 - 0.9 % Final    Immature Granulocyte Count (IG) includes promyelocytes, myelocytes and metamyelocytes but does not include bands. Percent differential counts (%) should be interpreted in the context of the absolute cell counts (cells/UL).    Lymphocytes % 11/17/2023 41.8  13.0 - 44.0 % Final    Monocytes % 11/17/2023 5.6  2.0 - 10.0 % Final    Eosinophils % 11/17/2023 1.5  0.0 - 6.0 % Final    Basophils % 11/17/2023 0.2  0.0 - 2.0 % Final    Neutrophils Absolute 11/17/2023 4.91  1.20 - 7.70 x10*3/uL Final    Percent differential counts (%) should be interpreted in the context of the absolute cell counts (cells/uL).    Immature Granulocytes Absolute, Au* 11/17/2023 0.03  0.00 - 0.70 x10*3/uL Final    Lymphocytes Absolute 11/17/2023 4.06  1.20 - 4.80 x10*3/uL Final    Monocytes Absolute 11/17/2023 0.54  0.10 - 1.00 x10*3/uL Final    Eosinophils Absolute 11/17/2023 0.15  0.00 - 0.70 x10*3/uL Final    Basophils Absolute 11/17/2023 0.02  0.00 - 0.10 x10*3/uL Final    Magnesium 11/17/2023 2.23  1.60 - 2.40 mg/dL Final    Thyroid Stimulating Hormone 11/17/2023 1.63  0.44 - 3.98 mIU/L Final    C-Reactive Protein 11/17/2023 0.57  <1.00 mg/dL Final   Orders Only on 04/17/2023   Component Date Value Ref Range Status    WBC 04/17/2023 8.8  4.4 - 11.3 x10E9/L Final    nRBC 04/17/2023 0.0  0.0 - 0.0 /100 WBC Final    RBC 04/17/2023 5.25 (H)  4.00 - 5.20 x10E12/L Final    Hemoglobin 04/17/2023 14.2  12.0 - 16.0 g/dL Final    Hematocrit 04/17/2023 43.2  36.0 - 46.0 % Final    MCV 04/17/2023 82  80 - 100 fL Final    MCHC 04/17/2023 32.9  32.0 - 36.0 g/dL Final    Platelets 04/17/2023 255  150 - 450 x10E9/L Final    RDW 04/17/2023 13.0  11.5 - 14.5 % Final    Neutrophils % 04/17/2023 42.9  40.0 - 80.0 % Final    Immature Granulocytes %, Automated 04/17/2023 0.1  0.0 - 0.9 % Final    Comment:  Immature Granulocyte Count (IG) includes  promyelocytes,    myelocytes and metamyelocytes but does not include bands.   Percent differential counts (%) should be interpreted in the   context of the absolute cell counts (cells/L).      Lymphocytes % 04/17/2023 48.4  13.0 - 44.0 % Final    Monocytes % 04/17/2023 5.5  2.0 - 10.0 % Final    Eosinophils % 04/17/2023 2.8  0.0 - 6.0 % Final    Basophils % 04/17/2023 0.3  0.0 - 2.0 % Final    Neutrophils Absolute 04/17/2023 3.77  1.20 - 7.70 x10E9/L Final    Lymphocytes Absolute 04/17/2023 4.26  1.20 - 4.80 x10E9/L Final    Monocytes Absolute 04/17/2023 0.48  0.10 - 1.00 x10E9/L Final    Eosinophils Absolute 04/17/2023 0.25  0.00 - 0.70 x10E9/L Final    Basophils Absolute 04/17/2023 0.03  0.00 - 0.10 x10E9/L Final   Orders Only on 04/17/2023   Component Date Value Ref Range Status    Vitamin D, 25-Hydroxy 04/17/2023 28 (A)  ng/mL Final    Comment: .  DEFICIENCY:         < 20   NG/ML  INSUFFICIENCY:      20-29  NG/ML  SUFFICIENCY:         NG/ML    THIS ASSAY ACCURATELY QUANTIFIES THE SUM OF  VITAMIN D3, 25-HYDROXY AND VIT D2,25-HYDROXY.     Orders Only on 04/17/2023   Component Date Value Ref Range Status    Cholesterol 04/17/2023 164  0 - 199 mg/dL Final    Comment: .      AGE      DESIRABLE   BORDERLINE HIGH   HIGH     0-19 Y     0 - 169       170 - 199     >/= 200    20-24 Y     0 - 189       190 - 224     >/= 225         >24 Y     0 - 199       200 - 239     >/= 240   **All ranges are based on fasting samples. Specific   therapeutic targets will vary based on patient-specific   cardiac risk.  .   Pediatric guidelines reference:Pediatrics 2011, 128(S5).   Adult guidelines reference: NCEP ATPIII Guidelines,     YIFAN 2001, 258:2486-97  .   Venipuncture immediately after or during the    administration of Metamizole may lead to falsely   low results. Testing should be performed immediately   prior to Metamizole dosing.      HDL 04/17/2023 63.5  mg/dL Final    Comment: .      AGE      VERY LOW   LOW     NORMAL     HIGH       0-19 Y       < 35   < 40     40-45     ----    20-24 Y       ----   < 40       >45     ----      >24 Y       ----   < 40     40-60      >60  .      Cholesterol/HDL Ratio 04/17/2023 2.6   Final    Comment: REF VALUES  DESIRABLE  < 3.4  HIGH RISK  > 5.0      LDL 04/17/2023 85  0 - 99 mg/dL Final    Comment: .                           NEAR      BORD      AGE      DESIRABLE  OPTIMAL    HIGH     HIGH     VERY HIGH     0-19 Y     0 - 109     ---    110-129   >/= 130     ----    20-24 Y     0 - 119     ---    120-159   >/= 160     ----      >24 Y     0 -  99   100-129  130-159   160-189     >/=190  .      VLDL 04/17/2023 15  0 - 40 mg/dL Final    Triglycerides 04/17/2023 76  0 - 149 mg/dL Final    Comment: .      AGE      DESIRABLE   BORDERLINE HIGH   HIGH     VERY HIGH   0 D-90 D    19 - 174         ----         ----        ----  91 D- 9 Y     0 -  74        75 -  99     >/= 100      ----    10-19 Y     0 -  89        90 - 129     >/= 130      ----    20-24 Y     0 - 114       115 - 149     >/= 150      ----         >24 Y     0 - 149       150 - 199    200- 499    >/= 500  .   Venipuncture immediately after or during the    administration of Metamizole may lead to falsely   low results. Testing should be performed immediately   prior to Metamizole dosing.     Orders Only on 04/17/2023   Component Date Value Ref Range Status    Free T4 04/17/2023 1.12  0.78 - 1.48 ng/dL Final    Comment:  Thyroxine Free testing is performed using different testing    methodology at [facility] than at other    [facility]. Direct result comparisons should    only be made within the same method.     Orders Only on 04/17/2023   Component Date Value Ref Range Status    T3, Free 04/17/2023 3.6  2.3 - 4.2 pg/mL Final   Orders Only on 04/17/2023   Component Date Value Ref Range Status    Glucose 04/17/2023 86  74 - 99 mg/dL Final    Sodium 04/17/2023 141  136 - 145 mmol/L Final    Potassium 04/17/2023 4.2  3.5 -  5.3 mmol/L Final    Chloride 04/17/2023 107  98 - 107 mmol/L Final    Bicarbonate 04/17/2023 25  21 - 32 mmol/L Final    Anion Gap 04/17/2023 13  10 - 20 mmol/L Final    Urea Nitrogen 04/17/2023 14  6 - 23 mg/dL Final    Creatinine 04/17/2023 0.76  0.50 - 1.05 mg/dL Final    GFR Female 04/17/2023 >90  >90 mL/min/1.73m2 Final    Comment:  CALCULATIONS OF ESTIMATED GFR ARE PERFORMED   USING THE 2021 CKD-EPI STUDY REFIT EQUATION   WITHOUT THE RACE VARIABLE FOR THE IDMS-TRACEABLE   CREATININE METHODS.    https://jasn.asnjournals.org/content/early/2021/09/22/ASN.6720695622      Calcium 04/17/2023 9.5  8.6 - 10.6 mg/dL Final    Albumin 04/17/2023 4.5  3.4 - 5.0 g/dL Final    Alkaline Phosphatase 04/17/2023 75  33 - 110 U/L Final    Total Protein 04/17/2023 7.5  6.4 - 8.2 g/dL Final    AST 04/17/2023 15  9 - 39 U/L Final    Total Bilirubin 04/17/2023 0.8  0.0 - 1.2 mg/dL Final    ALT (SGPT) 04/17/2023 20  7 - 45 U/L Final    Comment:  Patients treated with Sulfasalazine may generate    falsely decreased results for ALT.     Orders Only on 04/17/2023   Component Date Value Ref Range Status    TSH 04/17/2023 1.64  0.44 - 3.98 mIU/L Final    Comment:  TSH testing is performed using different testing    methodology at [facility] than at other    [facility] hospitals. Direct result comparisons should    only be made within the same method.       No images are attached to the encounter.        Assessment and Plan  Assessment/Plan

## 2024-03-10 PROBLEM — Z80.3 FAMILY HISTORY OF BREAST CANCER IN FIRST DEGREE RELATIVE: Status: ACTIVE | Noted: 2024-03-10

## 2024-03-10 PROBLEM — R20.2 NUMBNESS AND TINGLING IN RIGHT HAND: Status: ACTIVE | Noted: 2024-03-10

## 2024-03-10 PROBLEM — R20.0 NUMBNESS AND TINGLING IN RIGHT HAND: Status: ACTIVE | Noted: 2024-03-10

## 2024-03-11 ENCOUNTER — HOSPITAL ENCOUNTER (OUTPATIENT)
Dept: NEUROLOGY | Facility: HOSPITAL | Age: 29
Discharge: HOME | End: 2024-03-11
Payer: COMMERCIAL

## 2024-03-11 DIAGNOSIS — R20.2 NUMBNESS AND TINGLING IN RIGHT HAND: ICD-10-CM

## 2024-03-11 DIAGNOSIS — G56.01 CARPAL TUNNEL SYNDROME OF RIGHT WRIST: Primary | ICD-10-CM

## 2024-03-11 DIAGNOSIS — R20.0 NUMBNESS AND TINGLING IN RIGHT HAND: ICD-10-CM

## 2024-03-11 DIAGNOSIS — G56.02 CARPAL TUNNEL SYNDROME OF LEFT WRIST: ICD-10-CM

## 2024-03-11 PROCEDURE — 95886 MUSC TEST DONE W/N TEST COMP: CPT | Mod: RT,GC | Performed by: PSYCHIATRY & NEUROLOGY

## 2024-03-11 PROCEDURE — 95886 MUSC TEST DONE W/N TEST COMP: CPT | Performed by: PSYCHIATRY & NEUROLOGY

## 2024-03-11 PROCEDURE — 95913 NRV CNDJ TEST 13/> STUDIES: CPT | Performed by: PSYCHIATRY & NEUROLOGY

## 2024-03-11 PROCEDURE — 95885 MUSC TST DONE W/NERV TST LIM: CPT | Performed by: PSYCHIATRY & NEUROLOGY

## 2024-03-11 PROCEDURE — 95885 MUSC TST DONE W/NERV TST LIM: CPT | Mod: GC,LT | Performed by: PSYCHIATRY & NEUROLOGY

## 2024-03-11 NOTE — ASSESSMENT & PLAN NOTE
(maternal grandmother  at age 56,  Suzannes mom diagnosed w breast cancer at age 59).  BRCA gene tested for mom- negative.    -refer to breast onc for screening recommendations.

## 2024-03-11 NOTE — ASSESSMENT & PLAN NOTE
-Prior workup includes noncontrast MRI brain 12/8/2023-unremarkable noncontrast MRI except for a susceptibility artifact in the right auricular and infra-auricular region.  -EMG ordered  MRI cervical spine to assess for nerve compression ordered

## 2024-03-11 NOTE — ASSESSMENT & PLAN NOTE
>>ASSESSMENT AND PLAN FOR NUMBNESS AND TINGLING IN RIGHT HAND WRITTEN ON 3/10/2024 10:08 PM BY EVY RUFF    -Prior workup includes noncontrast MRI brain 12/8/2023-unremarkable noncontrast MRI except for a susceptibility artifact in the right auricular and infra-auricular region.  -EMG ordered  MRI cervical spine to assess for nerve compression ordered

## 2024-03-19 DIAGNOSIS — R74.01 ELEVATED ALT MEASUREMENT: Primary | ICD-10-CM

## 2024-03-20 ENCOUNTER — APPOINTMENT (OUTPATIENT)
Dept: RADIOLOGY | Facility: CLINIC | Age: 29
End: 2024-03-20
Payer: COMMERCIAL

## 2024-03-26 ENCOUNTER — OFFICE VISIT (OUTPATIENT)
Dept: DERMATOLOGY | Facility: CLINIC | Age: 29
End: 2024-03-26
Payer: COMMERCIAL

## 2024-03-26 DIAGNOSIS — D22.9 NEVUS: ICD-10-CM

## 2024-03-26 DIAGNOSIS — Z12.83 SKIN CANCER SCREENING: Primary | ICD-10-CM

## 2024-03-26 DIAGNOSIS — L81.4 LENTIGO: ICD-10-CM

## 2024-03-26 PROCEDURE — 1036F TOBACCO NON-USER: CPT | Performed by: NURSE PRACTITIONER

## 2024-03-26 PROCEDURE — 99203 OFFICE O/P NEW LOW 30 MIN: CPT | Performed by: NURSE PRACTITIONER

## 2024-03-26 NOTE — PROGRESS NOTES
Subjective     Suzanne Suazo is a 29 y.o. female who presents for the following: Skin Check.     New patient in for skin exam.     Review of Systems:  No other skin or systemic complaints other than what is documented elsewhere in the note.    The following portions of the chart were reviewed this encounter and updated as appropriate:       Skin Cancer History  No skin cancer on file.    Specialty Problems    None    Past Medical History:  Suzanne Suazo  has a past medical history of Exercise induced bronchospasm (04/14/2017), Other conditions influencing health status (04/14/2017), Personal history of other diseases of the digestive system, Personal history of other specified conditions (05/20/2014), Personal history of other specified conditions (04/16/2019), Polycystic ovarian syndrome (04/16/2018), and Rash and other nonspecific skin eruption (05/28/2015).    Past Surgical History:  Suzanne Suazo  has a past surgical history that includes Other surgical history (05/20/2014); Other surgical history (05/20/2014); Other surgical history (10/18/2019); Other surgical history (12/20/2019); Other surgical history (12/20/2019); and Other surgical history (04/12/2021).    Family History:  Patient family history includes Breast cancer in her maternal grandmother; Breast cancer (age of onset: 59) in her mother.    Social History:  Suzanne Suazo  reports that she has never smoked. She has never used smokeless tobacco. She reports that she does not currently use alcohol. She reports that she does not use drugs.    Allergies:  Sulfa (sulfonamide antibiotics)    Current Medications / CAM's:    Current Outpatient Medications:     prenatal vit,calc76-iron-folic (Prenatabs Rx) 29 mg iron- 1 mg tablet, Take by mouth., Disp: , Rfl:      Objective   Well appearing patient in no apparent distress; mood and affect are within normal limits.      Assessment/Plan   1. Skin cancer screening    The patient presented for a routine skin  examination today. There are no specific concerns regarding skin health and no new or changing moles, lesions, or rashes.     Assessment: Based on the comprehensive skin examination, there were no concerning or abnormal findings. The patient's skin appeared to be in good health, without any notable dermatologic conditions or lesions.    Plan: Given the absence of any significant skin findings, no specific interventions or treatments are warranted at this time. The patient was educated on the importance of regular skin self-examinations and advised to promptly report any changes or concerns. Routine follow-up for a skin examination was recommended.    -These lesions have benign, reassuring patterns on dermoscopy.  -There were no concerning features found on exam today.  -Recommend continued self-observation, and to contact the office if any changes in nevi are  noticed.    Discussed/information given on safe sun practices and use of sunscreen, sun protective clothing or sun avoidance. Recommend to use OTC medication of sunscreen SPF 30 or higher on a daily basis prior to sun exposure to reduce the risk of skin cancer.    Contact Office if: Any lesions change in size, shape or color; itch, bum or bleed.         2. Nevus  Multiple benign appearing flesh colored to pigmented macules and papules     Plan: Counseling.  I counseled the patient regarding the following:  Instructions: Monthly self-skin checks to monitor for any changes in moles are recommended. Expectations: Benign Nevi are pigmented nests of cells within the skin.No treatment is necessary. Contact Office if: Any moles change in size, shape or color; itch, bum or bleed.    3. Lentigo  Scattered tan macules in sun-exposed areas.    Solar lentigo (a type of lentigo also known as a senile lentigo, age spot, or liver spot) is a benign pigmented macule appearing on fair-skinned individuals that is related to ultraviolet radiation (UVR) exposure, typically from  the sun.     PLAN:  Limiting sun exposure through avoidance, protective clothing, and use of sunscreens can help prevent the appearance of solar lentigines.    If lesion changes or becomes symptomatic she should return to clinic

## 2024-04-15 DIAGNOSIS — R10.11 RUQ PAIN: Primary | ICD-10-CM

## 2024-04-30 ENCOUNTER — OFFICE VISIT (OUTPATIENT)
Dept: ORTHOPEDIC SURGERY | Facility: CLINIC | Age: 29
End: 2024-04-30
Payer: COMMERCIAL

## 2024-04-30 DIAGNOSIS — G56.02 CARPAL TUNNEL SYNDROME OF LEFT WRIST: ICD-10-CM

## 2024-04-30 DIAGNOSIS — G56.01 CARPAL TUNNEL SYNDROME OF RIGHT WRIST: ICD-10-CM

## 2024-04-30 PROCEDURE — 99203 OFFICE O/P NEW LOW 30 MIN: CPT | Performed by: STUDENT IN AN ORGANIZED HEALTH CARE EDUCATION/TRAINING PROGRAM

## 2024-04-30 PROCEDURE — 1036F TOBACCO NON-USER: CPT | Performed by: STUDENT IN AN ORGANIZED HEALTH CARE EDUCATION/TRAINING PROGRAM

## 2024-04-30 NOTE — PROGRESS NOTES
History of Present Illness:  Presents with  bilateral hand numbness. The symptoms have been present for months. The patient denies any inciting trauma. The numbness primarily involves the thumb, index finger, and long finger. There is minimal numbness in the small finger. The patient complains of intermittant, moderate hand pain which is worse at night. It causes frequent night awakenings. The patient presents due to worsening symptoms      Has not tried consistent nighttime bracing.  Recently found out pregnant with twins.    Review of Systems   GENERAL: Negative for malaise, significant weight loss, fever  MUSCULOSKELETAL: see HPI  NEURO:  Negative    The patient's past medical history, family history, social history, and review of systems were reviewed. History is otherwise negative except as stated in the HPI.    Physical Examination:  General: Alert and oriented to person, place, and time.  No acute distress and breathing comfortably: Pleasant and cooperative with examination.  HEENT: Head is normocephalic and atraumatic.  Neck: Supple, no visible swelling.  Cardiovascular: No palpable tachycardia  Lungs: No audible wheezing or labored breathing  Abdomen: Nondistended.  On musculoskeletal examination bilateral laterally, the patient has full elbow range of motion. There is no tenderness to palpation about the lateral epicondyle. Tinels at the cubital tunnel and elbow flexion/compression are negative for ulnar nerve symptoms. In regards to the wrist, there is no obvious deformity. Range of motion is full in flexion, extension, pronation, and supination. Strength is 5/5 in flexion and extension. There is no tenderness to palpation about the 1st dorsal compartment, the 1st CMC joint, or the TFCC. Tinels at the carpal tunnel and Durkins compression test are positive. The patient has subjective paresthesias in the median nerve distribution. Sensation and motor function are intact in the radial, and ulnar nerve  distribution. There is no obvious thenar atrophy, and thenar strength is 5/5. There is no intrinsic atrophy, and intrinsic strength is 5/5. All fingers are without triggering and are without pain over the A1 pulley. The patient can make a full composite fist. The hand itself is warm and well perfused. The skin is intact throughout.     Imaging:  N/A    Assessment:  Patient with bilateral carpal tunnel syndrome.     Plan:   Observation.  had a long discussion with the patient regarding the diagnosis of CTS and its treatment.  At this point, I have recommended initiation nighttime wrist splinting in the neutral position. They will monitor symptoms and follow up if continued progression or worsening.     Radha Connor MD  Orthopaedic Surgeon

## 2024-06-24 ENCOUNTER — HOSPITAL ENCOUNTER (OUTPATIENT)
Dept: RADIOLOGY | Facility: CLINIC | Age: 29
Discharge: HOME | End: 2024-06-24
Payer: COMMERCIAL

## 2024-06-24 DIAGNOSIS — O30.049 DICHORIONIC DIAMNIOTIC TWIN PREGNANCY (HHS-HCC): ICD-10-CM

## 2024-06-24 DIAGNOSIS — Z34.90 ENCOUNTER FOR SUPERVISION OF NORMAL PREGNANCY, UNSPECIFIED, UNSPECIFIED TRIMESTER (HHS-HCC): ICD-10-CM

## 2024-06-24 PROCEDURE — 76815 OB US LIMITED FETUS(S): CPT | Performed by: STUDENT IN AN ORGANIZED HEALTH CARE EDUCATION/TRAINING PROGRAM

## 2024-06-24 PROCEDURE — 76815 OB US LIMITED FETUS(S): CPT

## 2024-07-29 ENCOUNTER — HOSPITAL ENCOUNTER (OUTPATIENT)
Dept: RADIOLOGY | Facility: CLINIC | Age: 29
Discharge: HOME | End: 2024-07-29
Payer: COMMERCIAL

## 2024-07-29 DIAGNOSIS — Z34.90 ENCOUNTER FOR SUPERVISION OF NORMAL PREGNANCY, UNSPECIFIED, UNSPECIFIED TRIMESTER (HHS-HCC): ICD-10-CM

## 2024-07-29 DIAGNOSIS — O30.042 DICHORIONIC DIAMNIOTIC TWIN PREGNANCY IN SECOND TRIMESTER (HHS-HCC): ICD-10-CM

## 2024-07-29 PROCEDURE — 76817 TRANSVAGINAL US OBSTETRIC: CPT | Performed by: OBSTETRICS & GYNECOLOGY

## 2024-07-29 PROCEDURE — 76812 OB US DETAILED ADDL FETUS: CPT

## 2024-07-29 PROCEDURE — 76811 OB US DETAILED SNGL FETUS: CPT

## 2024-07-29 PROCEDURE — 76817 TRANSVAGINAL US OBSTETRIC: CPT

## 2024-07-29 PROCEDURE — 76812 OB US DETAILED ADDL FETUS: CPT | Performed by: OBSTETRICS & GYNECOLOGY

## 2024-07-29 PROCEDURE — 76811 OB US DETAILED SNGL FETUS: CPT | Performed by: OBSTETRICS & GYNECOLOGY

## 2024-08-26 ENCOUNTER — HOSPITAL ENCOUNTER (OUTPATIENT)
Dept: RADIOLOGY | Facility: CLINIC | Age: 29
Discharge: HOME | End: 2024-08-26
Payer: COMMERCIAL

## 2024-08-26 DIAGNOSIS — Z34.90 ENCOUNTER FOR SUPERVISION OF NORMAL PREGNANCY, UNSPECIFIED, UNSPECIFIED TRIMESTER (HHS-HCC): ICD-10-CM

## 2024-08-26 PROCEDURE — 76816 OB US FOLLOW-UP PER FETUS: CPT

## 2024-08-26 PROCEDURE — 76816 OB US FOLLOW-UP PER FETUS: CPT | Performed by: MEDICAL GENETICS

## 2024-09-27 ENCOUNTER — HOSPITAL ENCOUNTER (OUTPATIENT)
Dept: RADIOLOGY | Facility: CLINIC | Age: 29
Discharge: HOME | End: 2024-09-27
Payer: COMMERCIAL

## 2024-09-27 DIAGNOSIS — Z34.90 ENCOUNTER FOR SUPERVISION OF NORMAL PREGNANCY, UNSPECIFIED, UNSPECIFIED TRIMESTER: ICD-10-CM

## 2024-09-27 PROCEDURE — 76816 OB US FOLLOW-UP PER FETUS: CPT

## 2024-10-22 ENCOUNTER — HOSPITAL ENCOUNTER (OUTPATIENT)
Dept: RADIOLOGY | Facility: CLINIC | Age: 29
Discharge: HOME | End: 2024-10-22
Payer: COMMERCIAL

## 2024-10-22 DIAGNOSIS — O24.419 GESTATIONAL DIABETES: ICD-10-CM

## 2024-10-22 DIAGNOSIS — O30.049 DICHORIONIC DIAMNIOTIC TWIN PREGNANCY, ANTEPARTUM (HHS-HCC): ICD-10-CM

## 2024-10-22 DIAGNOSIS — Z34.90 ENCOUNTER FOR SUPERVISION OF NORMAL PREGNANCY, UNSPECIFIED, UNSPECIFIED TRIMESTER: ICD-10-CM

## 2024-10-22 PROCEDURE — 76816 OB US FOLLOW-UP PER FETUS: CPT | Performed by: MEDICAL GENETICS

## 2024-10-22 PROCEDURE — 76819 FETAL BIOPHYS PROFIL W/O NST: CPT

## 2024-10-22 PROCEDURE — 76816 OB US FOLLOW-UP PER FETUS: CPT

## 2024-10-22 PROCEDURE — 76819 FETAL BIOPHYS PROFIL W/O NST: CPT | Performed by: MEDICAL GENETICS

## 2024-11-12 ENCOUNTER — APPOINTMENT (OUTPATIENT)
Dept: RADIOLOGY | Facility: CLINIC | Age: 29
End: 2024-11-12
Payer: COMMERCIAL

## 2025-02-27 ENCOUNTER — APPOINTMENT (OUTPATIENT)
Dept: PRIMARY CARE | Facility: CLINIC | Age: 30
End: 2025-02-27
Payer: COMMERCIAL

## 2025-02-27 VITALS
BODY MASS INDEX: 30.23 KG/M2 | DIASTOLIC BLOOD PRESSURE: 83 MMHG | HEIGHT: 64 IN | TEMPERATURE: 98 F | HEART RATE: 87 BPM | OXYGEN SATURATION: 99 % | WEIGHT: 177.1 LBS | SYSTOLIC BLOOD PRESSURE: 125 MMHG

## 2025-02-27 DIAGNOSIS — K76.0 FATTY LIVER DISEASE, NONALCOHOLIC: ICD-10-CM

## 2025-02-27 DIAGNOSIS — Z00.00 HEALTHCARE MAINTENANCE: Primary | ICD-10-CM

## 2025-02-27 DIAGNOSIS — Z86.32 HISTORY OF GESTATIONAL DIABETES: ICD-10-CM

## 2025-02-27 PROCEDURE — 1036F TOBACCO NON-USER: CPT | Performed by: INTERNAL MEDICINE

## 2025-02-27 PROCEDURE — 3008F BODY MASS INDEX DOCD: CPT | Performed by: INTERNAL MEDICINE

## 2025-02-27 PROCEDURE — 99395 PREV VISIT EST AGE 18-39: CPT | Performed by: INTERNAL MEDICINE

## 2025-02-27 RX ORDER — DROSPIRENONE 4 MG/1
4 TABLET, FILM COATED ORAL
COMMUNITY
Start: 2025-01-06

## 2025-02-27 ASSESSMENT — ENCOUNTER SYMPTOMS
SORE THROAT: 0
ARTHRALGIAS: 0
VOMITING: 0
CONSTIPATION: 0
FATIGUE: 0
COUGH: 0
SHORTNESS OF BREATH: 0
LIGHT-HEADEDNESS: 0
DIARRHEA: 0
ABDOMINAL PAIN: 1
HEMATURIA: 0
HEADACHES: 0
DYSURIA: 0
PALPITATIONS: 0
CONFUSION: 0
CHILLS: 0
DIZZINESS: 0
DYSPHORIC MOOD: 0
NAUSEA: 0
FEVER: 0
BACK PAIN: 0

## 2025-02-27 NOTE — PROGRESS NOTES
CHIEF COMPLAINT  Annual Exam    HISTORY OF PRESENT ILLNESS  Suzanne Suazo is a 29 y.o. female presents today for follow up of Annual Exam    HPI    Last visit with me 2018.  Since then, had 4 children.    Cholecystectomy 2021.   Has had some RUQ discomfort ever since then.  Had US during pregnancy April 2024 showing fatty liver.  Gestational diabetes with most recent pregnancy (twins).   Was on insulin.   Seeing endocrinologist - has labs pending.     Currently on low dose progesterone OCP.  Breastfeeding.    REVIEW OF SYSTEMS  Review of Systems   Constitutional:  Negative for chills, fatigue and fever.   HENT:  Negative for sore throat.    Respiratory:  Negative for cough and shortness of breath.    Cardiovascular:  Negative for chest pain, palpitations and leg swelling.   Gastrointestinal:  Positive for abdominal pain. Negative for constipation, diarrhea, nausea and vomiting.   Genitourinary:  Negative for dysuria and hematuria.   Musculoskeletal:  Negative for arthralgias, back pain and gait problem.   Skin:  Negative for rash.   Neurological:  Negative for dizziness, light-headedness and headaches.   Psychiatric/Behavioral:  Negative for confusion and dysphoric mood.        ALLERGIES  Sulfa (sulfonamide antibiotics)    MEDICATIONS  Current Outpatient Medications   Medication Instructions    prenatal vit,calc76-iron-folic (Prenatabs Rx) 29 mg iron- 1 mg tablet Take by mouth.    Slynd 4 mg       TOBACCO USE  Social History     Tobacco Use   Smoking Status Never   Smokeless Tobacco Never       DEPRESSION SCREEN  Over the past 2 weeks, how often have you been bothered by any of the following problems?  Little interest or pleasure in doing things: Not at all  Feeling down, depressed, or hopeless: Not at all    SURGICAL HISTORY  Past Surgical History:  05/20/2014: OTHER SURGICAL HISTORY      Comment:  Dental Surgery  05/20/2014: OTHER SURGICAL HISTORY      Comment:  Dilation Salivary Duct  10/18/2019: OTHER SURGICAL  "HISTORY      Comment:  Esophagogastroduodenoscopy  12/20/2019: OTHER SURGICAL HISTORY      Comment:  History of prior surgery  12/20/2019: OTHER SURGICAL HISTORY      Comment:  [place] tooth extraction  04/12/2021: OTHER SURGICAL HISTORY      Comment:  Cholecystectomy laparoscopic       OBJECTIVE    /83   Pulse 87   Temp 36.7 °C (98 °F)   Ht 1.626 m (5' 4\")   Wt 80.3 kg (177 lb 1.6 oz)   SpO2 99%   Breastfeeding Yes   BMI 30.40 kg/m²    BMI: Estimated body mass index is 30.4 kg/m² as calculated from the following:    Height as of this encounter: 1.626 m (5' 4\").    Weight as of this encounter: 80.3 kg (177 lb 1.6 oz).    BP Readings from Last 3 Encounters:   02/27/25 125/83   03/05/24 122/78   11/17/23 111/82      Wt Readings from Last 3 Encounters:   02/27/25 80.3 kg (177 lb 1.6 oz)   03/05/24 88.2 kg (194 lb 8 oz)   11/17/23 85.7 kg (189 lb)        PHYSICAL EXAM  Physical Exam  Constitutional:       Appearance: Normal appearance.   HENT:      Head: Normocephalic and atraumatic.      Right Ear: Tympanic membrane and ear canal normal.      Left Ear: Tympanic membrane and ear canal normal.   Cardiovascular:      Rate and Rhythm: Normal rate and regular rhythm.      Pulses: Normal pulses.   Pulmonary:      Effort: Pulmonary effort is normal. No respiratory distress.      Breath sounds: Normal breath sounds. No wheezing.   Abdominal:      General: There is no distension.      Palpations: Abdomen is soft. There is no mass.      Tenderness: There is no abdominal tenderness. There is no guarding.   Musculoskeletal:      Right lower leg: No edema.      Left lower leg: No edema.   Skin:     Findings: No rash.   Neurological:      General: No focal deficit present.      Mental Status: She is alert and oriented to person, place, and time. Mental status is at baseline.   Psychiatric:         Mood and Affect: Mood normal.         Behavior: Behavior normal.         Thought Content: Thought content normal.         " Judgment: Judgment normal.          ASSESSMENT AND PLAN  Assessment/Plan   Problem List Items Addressed This Visit       Healthcare maintenance - Primary    Relevant Orders    Lipid Panel    Comprehensive Metabolic Panel    CBC and Auto Differential    History of gestational diabetes    Fatty liver disease, nonalcoholic       Well Visit    BP is normal.  Healthy habits reviewed and recommended.  Aim for 30 minutes of aerobic exercise, 5 times per week.  Try to cut back on processed foods, including foods made with flour, sugar, added salt, and saturated fat.    Routine fasting labs - CBC, CMP, FLP.    History of gestational diabetes - seeing Dr. Fletcher.  Having A1c and thyroid checked soon.  She has some concerns about cortisol and PCOS.  I encouraged her to discuss further with Dr. Fletcher when she sees him in July.      She wants to get her weight back down 145-150 lbs.  When she is done breast feeding, can follow reduced calorie diet.  Stay active now as able.    Fatty liver - per US April 2024, LFT's were normal at that time.  Weight loss will help.  Can reassess in 6 months.  If liver enzymes are elevated, can consider repeat US sooner.  She does not have any hepatomegaly or liver tenderness today.  May have chronic abdominal wall pain from prior cholecystectomy.     Follows with GYN for Pap testing.    Sees dermatologist annually for skin check.    Advised to stay up to date with routine dental and eye exams.    Immunizations are up to date.    Follow-up annually for well visit.

## 2025-03-03 LAB
NON-UH HIE A/G RATIO: 1.2
NON-UH HIE ALB: 4 G/DL (ref 3.4–5)
NON-UH HIE ALK PHOS: 71 UNIT/L (ref 45–117)
NON-UH HIE BASO COUNT: 0.06 X1000 (ref 0–0.2)
NON-UH HIE BASOS %: 0.7 %
NON-UH HIE BILIRUBIN, TOTAL: 0.7 MG/DL (ref 0.3–1.2)
NON-UH HIE BUN/CREAT RATIO: 18.9
NON-UH HIE BUN/CREAT RATIO: 18.9
NON-UH HIE BUN: 17 MG/DL (ref 9–23)
NON-UH HIE BUN: 17 MG/DL (ref 9–23)
NON-UH HIE CALCIUM: 9.7 MG/DL (ref 8.7–10.4)
NON-UH HIE CALCIUM: 9.7 MG/DL (ref 8.7–10.4)
NON-UH HIE CALCULATED LDL CHOLESTEROL: 70 MG/DL (ref 60–130)
NON-UH HIE CALCULATED OSMOLALITY: 284 MOSM/KG (ref 275–295)
NON-UH HIE CALCULATED OSMOLALITY: 286 MOSM/KG (ref 275–295)
NON-UH HIE CHLORIDE: 107 MMOL/L (ref 98–107)
NON-UH HIE CHLORIDE: 108 MMOL/L (ref 98–107)
NON-UH HIE CHOLESTEROL: 142 MG/DL (ref 100–200)
NON-UH HIE CO2, VENOUS: 26 MMOL/L (ref 20–31)
NON-UH HIE CO2, VENOUS: 27 MMOL/L (ref 20–31)
NON-UH HIE CREATININE: 0.9 MG/DL (ref 0.5–0.8)
NON-UH HIE CREATININE: 0.9 MG/DL (ref 0.5–0.8)
NON-UH HIE DIFF?: ABNORMAL
NON-UH HIE DXH ACTIONS: ABNORMAL
NON-UH HIE EOS COUNT: 0.18 X1000 (ref 0–0.5)
NON-UH HIE EOSIN %: 1.9 %
NON-UH HIE GFR AA: >60
NON-UH HIE GFR AA: >60
NON-UH HIE GLOBULIN: 3.4 G/DL
NON-UH HIE GLOMERULAR FILTRATION RATE: >60 ML/MIN/1.73M?
NON-UH HIE GLOMERULAR FILTRATION RATE: >60 ML/MIN/1.73M?
NON-UH HIE GLUCOSE: 90 MG/DL (ref 74–106)
NON-UH HIE GLUCOSE: 91 MG/DL (ref 74–106)
NON-UH HIE GOT: 16 UNIT/L (ref 15–37)
NON-UH HIE GPT: 19 UNIT/L (ref 10–49)
NON-UH HIE HCT: 39.8 % (ref 36–46)
NON-UH HIE HDL CHOLESTEROL: 56 MG/DL (ref 40–60)
NON-UH HIE HGB A1C: 5.2 %
NON-UH HIE HGB: 13.7 G/DL (ref 12–16)
NON-UH HIE INSTR WBC: 9.2
NON-UH HIE K: 4.4 MMOL/L (ref 3.5–5.1)
NON-UH HIE K: 4.4 MMOL/L (ref 3.5–5.1)
NON-UH HIE LYMPH %: 44.4 %
NON-UH HIE LYMPH COUNT: 4.09 X1000 (ref 1.2–4.8)
NON-UH HIE MCH: 25.9 PG (ref 27–34)
NON-UH HIE MCHC: 34.4 G/DL (ref 32–37)
NON-UH HIE MCV: 75.3 FL (ref 80–100)
NON-UH HIE MONO %: 5.2 %
NON-UH HIE MONO COUNT: 0.48 X1000 (ref 0.1–1)
NON-UH HIE MPV: 8.5 FL (ref 7.4–10.4)
NON-UH HIE NA: 142 MMOL/L (ref 135–145)
NON-UH HIE NA: 143 MMOL/L (ref 135–145)
NON-UH HIE NEUTROPHIL %: 47.8 %
NON-UH HIE NEUTROPHIL COUNT (ANC): 4.4 X1000 (ref 1.4–8.8)
NON-UH HIE NUCLEATED RBC: 0 /100WBC
NON-UH HIE PLATELET: 258 X10 (ref 150–450)
NON-UH HIE RBC: 5.28 X10 (ref 4.2–5.4)
NON-UH HIE RDW: 18 % (ref 11.5–14.5)
NON-UH HIE RED BLOOD CELL MORPHOLOGY: ABNORMAL
NON-UH HIE SCAN DIFFERENTIAL: ABNORMAL
NON-UH HIE TOTAL CHOL/HDL CHOL RATIO: 2.5
NON-UH HIE TOTAL PROTEIN: 7.4 G/DL (ref 5.7–8.2)
NON-UH HIE TRIGLYCERIDES: 80 MG/DL (ref 30–150)
NON-UH HIE TSH: 1.32 UIU/ML (ref 0.55–4.78)
NON-UH HIE WBC: 9.2 X10 (ref 4.5–11)

## 2025-04-09 ENCOUNTER — OFFICE (OUTPATIENT)
Dept: URBAN - METROPOLITAN AREA CLINIC 26 | Facility: CLINIC | Age: 30
End: 2025-04-09
Payer: COMMERCIAL

## 2025-04-09 VITALS
DIASTOLIC BLOOD PRESSURE: 73 MMHG | SYSTOLIC BLOOD PRESSURE: 109 MMHG | TEMPERATURE: 98.5 F | WEIGHT: 180 LBS | HEART RATE: 82 BPM | HEIGHT: 64 IN

## 2025-04-09 DIAGNOSIS — R10.10 UPPER ABDOMINAL PAIN, UNSPECIFIED: ICD-10-CM

## 2025-04-09 PROCEDURE — 99203 OFFICE O/P NEW LOW 30 MIN: CPT | Performed by: INTERNAL MEDICINE

## 2025-04-16 VITALS
HEART RATE: 70 BPM | OXYGEN SATURATION: 99 % | RESPIRATION RATE: 9 BRPM | HEART RATE: 95 BPM | WEIGHT: 174 LBS | SYSTOLIC BLOOD PRESSURE: 118 MMHG | SYSTOLIC BLOOD PRESSURE: 118 MMHG | SYSTOLIC BLOOD PRESSURE: 121 MMHG | HEART RATE: 66 BPM | HEART RATE: 75 BPM | RESPIRATION RATE: 19 BRPM | HEIGHT: 64 IN | RESPIRATION RATE: 13 BRPM | OXYGEN SATURATION: 96 % | HEIGHT: 64 IN | DIASTOLIC BLOOD PRESSURE: 56 MMHG | SYSTOLIC BLOOD PRESSURE: 121 MMHG | SYSTOLIC BLOOD PRESSURE: 124 MMHG | DIASTOLIC BLOOD PRESSURE: 78 MMHG | OXYGEN SATURATION: 98 % | HEART RATE: 58 BPM | HEART RATE: 81 BPM | SYSTOLIC BLOOD PRESSURE: 100 MMHG | DIASTOLIC BLOOD PRESSURE: 79 MMHG | HEART RATE: 94 BPM | OXYGEN SATURATION: 95 % | OXYGEN SATURATION: 98 % | SYSTOLIC BLOOD PRESSURE: 124 MMHG | RESPIRATION RATE: 14 BRPM | OXYGEN SATURATION: 92 % | SYSTOLIC BLOOD PRESSURE: 127 MMHG | RESPIRATION RATE: 13 BRPM | OXYGEN SATURATION: 99 % | HEART RATE: 96 BPM | HEART RATE: 81 BPM | RESPIRATION RATE: 19 BRPM | OXYGEN SATURATION: 95 % | HEART RATE: 69 BPM | OXYGEN SATURATION: 97 % | HEART RATE: 96 BPM | OXYGEN SATURATION: 91 % | HEART RATE: 58 BPM | HEART RATE: 66 BPM | SYSTOLIC BLOOD PRESSURE: 100 MMHG | DIASTOLIC BLOOD PRESSURE: 59 MMHG | SYSTOLIC BLOOD PRESSURE: 102 MMHG | OXYGEN SATURATION: 94 % | OXYGEN SATURATION: 97 % | SYSTOLIC BLOOD PRESSURE: 101 MMHG | TEMPERATURE: 97.4 F | DIASTOLIC BLOOD PRESSURE: 56 MMHG | RESPIRATION RATE: 12 BRPM | HEART RATE: 94 BPM | RESPIRATION RATE: 17 BRPM | DIASTOLIC BLOOD PRESSURE: 81 MMHG | OXYGEN SATURATION: 99 % | SYSTOLIC BLOOD PRESSURE: 102 MMHG | WEIGHT: 174 LBS | HEART RATE: 71 BPM | OXYGEN SATURATION: 95 % | DIASTOLIC BLOOD PRESSURE: 78 MMHG | DIASTOLIC BLOOD PRESSURE: 81 MMHG | DIASTOLIC BLOOD PRESSURE: 59 MMHG | OXYGEN SATURATION: 94 % | HEART RATE: 70 BPM | OXYGEN SATURATION: 91 % | RESPIRATION RATE: 17 BRPM | RESPIRATION RATE: 13 BRPM | OXYGEN SATURATION: 97 % | RESPIRATION RATE: 14 BRPM | HEART RATE: 69 BPM | OXYGEN SATURATION: 94 % | TEMPERATURE: 97.4 F | OXYGEN SATURATION: 92 % | HEART RATE: 66 BPM | RESPIRATION RATE: 17 BRPM | DIASTOLIC BLOOD PRESSURE: 78 MMHG | OXYGEN SATURATION: 98 % | SYSTOLIC BLOOD PRESSURE: 101 MMHG | DIASTOLIC BLOOD PRESSURE: 60 MMHG | HEART RATE: 69 BPM | DIASTOLIC BLOOD PRESSURE: 81 MMHG | DIASTOLIC BLOOD PRESSURE: 60 MMHG | RESPIRATION RATE: 9 BRPM | HEIGHT: 64 IN | HEART RATE: 70 BPM | HEART RATE: 58 BPM | SYSTOLIC BLOOD PRESSURE: 124 MMHG | RESPIRATION RATE: 12 BRPM | HEART RATE: 75 BPM | SYSTOLIC BLOOD PRESSURE: 127 MMHG | DIASTOLIC BLOOD PRESSURE: 79 MMHG | TEMPERATURE: 97.4 F | HEART RATE: 95 BPM | HEART RATE: 94 BPM | OXYGEN SATURATION: 92 % | SYSTOLIC BLOOD PRESSURE: 118 MMHG | DIASTOLIC BLOOD PRESSURE: 85 MMHG | HEART RATE: 96 BPM | DIASTOLIC BLOOD PRESSURE: 79 MMHG | DIASTOLIC BLOOD PRESSURE: 85 MMHG | SYSTOLIC BLOOD PRESSURE: 121 MMHG | HEART RATE: 75 BPM | SYSTOLIC BLOOD PRESSURE: 102 MMHG | OXYGEN SATURATION: 96 % | HEART RATE: 71 BPM | SYSTOLIC BLOOD PRESSURE: 100 MMHG | SYSTOLIC BLOOD PRESSURE: 101 MMHG | OXYGEN SATURATION: 96 % | DIASTOLIC BLOOD PRESSURE: 60 MMHG | SYSTOLIC BLOOD PRESSURE: 127 MMHG | RESPIRATION RATE: 14 BRPM | DIASTOLIC BLOOD PRESSURE: 59 MMHG | RESPIRATION RATE: 19 BRPM | DIASTOLIC BLOOD PRESSURE: 85 MMHG | RESPIRATION RATE: 12 BRPM | OXYGEN SATURATION: 91 % | HEART RATE: 71 BPM | RESPIRATION RATE: 9 BRPM | WEIGHT: 174 LBS | HEART RATE: 95 BPM | DIASTOLIC BLOOD PRESSURE: 56 MMHG | HEART RATE: 81 BPM

## 2025-04-23 ENCOUNTER — AMBULATORY SURGICAL CENTER (OUTPATIENT)
Dept: URBAN - METROPOLITAN AREA SURGERY 12 | Facility: SURGERY | Age: 30
End: 2025-04-23
Payer: COMMERCIAL

## 2025-04-23 ENCOUNTER — OFFICE (OUTPATIENT)
Dept: URBAN - METROPOLITAN AREA PATHOLOGY 2 | Facility: PATHOLOGY | Age: 30
End: 2025-04-23
Payer: COMMERCIAL

## 2025-04-23 DIAGNOSIS — K31.7 POLYP OF STOMACH AND DUODENUM: ICD-10-CM

## 2025-04-23 DIAGNOSIS — K44.9 DIAPHRAGMATIC HERNIA WITHOUT OBSTRUCTION OR GANGRENE: ICD-10-CM

## 2025-04-23 DIAGNOSIS — K22.2 ESOPHAGEAL OBSTRUCTION: ICD-10-CM

## 2025-04-23 DIAGNOSIS — K21.00 GASTRO-ESOPHAGEAL REFLUX DISEASE WITH ESOPHAGITIS, WITHOUT B: ICD-10-CM

## 2025-04-23 DIAGNOSIS — R10.10 UPPER ABDOMINAL PAIN, UNSPECIFIED: ICD-10-CM

## 2025-04-23 DIAGNOSIS — K22.89 OTHER SPECIFIED DISEASE OF ESOPHAGUS: ICD-10-CM

## 2025-04-23 PROCEDURE — 43239 EGD BIOPSY SINGLE/MULTIPLE: CPT | Performed by: INTERNAL MEDICINE

## 2025-04-23 PROCEDURE — 43450 DILATE ESOPHAGUS 1/MULT PASS: CPT | Performed by: INTERNAL MEDICINE

## 2025-04-23 PROCEDURE — 88312 SPECIAL STAINS GROUP 1: CPT | Performed by: PATHOLOGY

## 2025-04-23 PROCEDURE — 88342 IMHCHEM/IMCYTCHM 1ST ANTB: CPT | Performed by: PATHOLOGY

## 2025-04-23 PROCEDURE — 88305 TISSUE EXAM BY PATHOLOGIST: CPT | Performed by: PATHOLOGY

## 2025-04-23 PROCEDURE — 88313 SPECIAL STAINS GROUP 2: CPT | Performed by: PATHOLOGY

## 2025-04-23 RX ORDER — PANTOPRAZOLE 40 MG/1
40 TABLET, DELAYED RELEASE ORAL
Qty: 30 | Refills: 5 | Status: ACTIVE
Start: 2025-04-23

## 2025-05-01 DIAGNOSIS — E61.1 IRON DEFICIENCY: Primary | ICD-10-CM

## 2025-07-07 LAB
NON-UH HIE GLUCOSE: 83 MG/DL (ref 74–106)
NON-UH HIE HGB A1C: 5 %

## 2025-08-07 LAB
BASOPHILS # BLD AUTO: 27 CELLS/UL (ref 0–200)
BASOPHILS NFR BLD AUTO: 0.3 %
EOSINOPHIL # BLD AUTO: 205 CELLS/UL (ref 15–500)
EOSINOPHIL NFR BLD AUTO: 2.3 %
ERYTHROCYTE [DISTWIDTH] IN BLOOD BY AUTOMATED COUNT: 13.1 % (ref 11–15)
FERRITIN SERPL-MCNC: 69 NG/ML (ref 16–154)
HCT VFR BLD AUTO: 47.1 % (ref 35–45)
HGB BLD-MCNC: 15.3 G/DL (ref 11.7–15.5)
IRON SATN MFR SERPL: 38 % (CALC) (ref 16–45)
IRON SERPL-MCNC: 132 MCG/DL (ref 40–190)
LYMPHOCYTES # BLD AUTO: 3925 CELLS/UL (ref 850–3900)
LYMPHOCYTES NFR BLD AUTO: 44.1 %
MCH RBC QN AUTO: 27.2 PG (ref 27–33)
MCHC RBC AUTO-ENTMCNC: 32.5 G/DL (ref 32–36)
MCV RBC AUTO: 83.8 FL (ref 80–100)
MONOCYTES # BLD AUTO: 534 CELLS/UL (ref 200–950)
MONOCYTES NFR BLD AUTO: 6 %
NEUTROPHILS # BLD AUTO: 4210 CELLS/UL (ref 1500–7800)
NEUTROPHILS NFR BLD AUTO: 47.3 %
PLATELET # BLD AUTO: 273 THOUSAND/UL (ref 140–400)
PMV BLD REES-ECKER: 10.9 FL (ref 7.5–12.5)
RBC # BLD AUTO: 5.62 MILLION/UL (ref 3.8–5.1)
TIBC SERPL-MCNC: 348 MCG/DL (CALC) (ref 250–450)
WBC # BLD AUTO: 8.9 THOUSAND/UL (ref 3.8–10.8)

## 2025-08-29 ENCOUNTER — OFFICE (OUTPATIENT)
Dept: URBAN - METROPOLITAN AREA CLINIC 26 | Facility: CLINIC | Age: 30
End: 2025-08-29
Payer: COMMERCIAL

## 2025-08-29 VITALS
DIASTOLIC BLOOD PRESSURE: 84 MMHG | WEIGHT: 183 LBS | SYSTOLIC BLOOD PRESSURE: 119 MMHG | HEIGHT: 64 IN | HEART RATE: 78 BPM

## 2025-08-29 DIAGNOSIS — R10.13 EPIGASTRIC PAIN: ICD-10-CM

## 2025-08-29 DIAGNOSIS — K22.2 ESOPHAGEAL OBSTRUCTION: ICD-10-CM

## 2025-08-29 PROCEDURE — 99214 OFFICE O/P EST MOD 30 MIN: CPT | Performed by: INTERNAL MEDICINE

## 2025-09-01 PROBLEM — K22.2 ESOPHAGEAL OBSTRUCTION: Status: ACTIVE | Noted: 2025-04-23

## 2026-01-12 ENCOUNTER — APPOINTMENT (OUTPATIENT)
Dept: DERMATOLOGY | Facility: CLINIC | Age: 31
End: 2026-01-12
Payer: COMMERCIAL